# Patient Record
Sex: MALE | Race: WHITE | NOT HISPANIC OR LATINO
[De-identification: names, ages, dates, MRNs, and addresses within clinical notes are randomized per-mention and may not be internally consistent; named-entity substitution may affect disease eponyms.]

---

## 2022-11-09 ENCOUNTER — NON-APPOINTMENT (OUTPATIENT)
Age: 57
End: 2022-11-09

## 2022-11-09 DIAGNOSIS — M25.661 STIFFNESS OF RIGHT KNEE, NOT ELSEWHERE CLASSIFIED: ICD-10-CM

## 2022-11-09 DIAGNOSIS — M25.551 PAIN IN RIGHT HIP: ICD-10-CM

## 2022-11-09 DIAGNOSIS — M17.32 UNILATERAL POST-TRAUMATIC OSTEOARTHRITIS, LEFT KNEE: ICD-10-CM
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
## 2022-11-09 DIAGNOSIS — M25.651 STIFFNESS OF RIGHT HIP, NOT ELSEWHERE CLASSIFIED: ICD-10-CM
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
## 2022-11-09 DIAGNOSIS — S83.91XD SPRAIN OF UNSPECIFIED SITE OF RIGHT KNEE, SUBSEQUENT ENCOUNTER: ICD-10-CM

## 2022-11-09 DIAGNOSIS — M25.561 PAIN IN RIGHT KNEE: ICD-10-CM

## 2022-11-09 DIAGNOSIS — S73.101D UNSPECIFIED SPRAIN OF RIGHT HIP, SUBSEQUENT ENCOUNTER: ICD-10-CM

## 2022-11-09 PROBLEM — Z00.00 ENCOUNTER FOR PREVENTIVE HEALTH EXAMINATION: Status: ACTIVE | Noted: 2022-11-09

## 2022-12-21 ENCOUNTER — APPOINTMENT (OUTPATIENT)
Dept: ORTHOPEDIC SURGERY | Facility: CLINIC | Age: 57
End: 2022-12-21

## 2022-12-21 VITALS — WEIGHT: 205 LBS | HEIGHT: 71 IN | BODY MASS INDEX: 28.7 KG/M2

## 2022-12-21 DIAGNOSIS — Z78.9 OTHER SPECIFIED HEALTH STATUS: ICD-10-CM

## 2022-12-21 PROCEDURE — 99213 OFFICE O/P EST LOW 20 MIN: CPT

## 2022-12-21 PROCEDURE — 73503 X-RAY EXAM HIP UNI 4/> VIEWS: CPT | Mod: RT

## 2022-12-21 PROCEDURE — 99072 ADDL SUPL MATRL&STAF TM PHE: CPT

## 2022-12-22 NOTE — DATA REVIEWED
[FreeTextEntry1] : X-rays done in the office today the AP pelvis 1 view in the right hip 2 views show severe bone-on-bone arthritis.  This is progressed since his previous x-ray.  His left hip is unaffected.  There were no tumors masses or calcifications seen.

## 2022-12-22 NOTE — HISTORY OF PRESENT ILLNESS
[5] : 5 [3] : 3 [Dull/Aching] : dull/aching [Localized] : localized [Tightness] : tightness [Not working due to injury] : Work status: not working due to injury [] : Post Surgical Visit: no [FreeTextEntry1] : R hip  [FreeTextEntry5] : 56 Y/O RHD M alexandraal R hip atraumatic chronic pain due to overuse at work  [FreeTextEntry3] : N/A Chronic [de-identified] : None [de-identified] : [place] [id number]

## 2022-12-22 NOTE — DISCUSSION/SUMMARY
[de-identified] : Recommendation at this time is a right total hip replacement.  He has tried failed all conservative measures to improve his situation.  He is unable to work because of the pain.  His activities of daily living are significantly compromised.  His x-rays have worsened over the last year.  He is currently not working.  He has 100% temporary partial disability to the right hip.  His right hip pain is causally related to his work accident.  We will see him back in the office in 2 months.  I will request authorization for right total hip replacement to do as soon as we get authorization.

## 2022-12-22 NOTE — IMAGING
[de-identified] : Examination of the right hip reveals severe restrictions in range of motion.  He has flexion just short of 90 degrees with pain.  His abduction is to 10 degrees.  His abduction is to neutral.  His rotation internally is 0 degrees and externally 10 degrees.  He has 5-5 strength of hip flexion and abduction.  He has no redness rashes or visible scars.

## 2022-12-22 NOTE — ASSESSMENT
[FreeTextEntry1] : Patient comes in today for follow-up on his right hip.  He continues have severe progressive pain of his right hip consistent with posttraumatic arthritis.  He has pain at night he has pain putting on his shoes and socks.  He cannot walk short or long distance he struggles with stairs.  He is clearly worsening.  He is also not working.

## 2023-02-05 ENCOUNTER — FORM ENCOUNTER (OUTPATIENT)
Age: 58
End: 2023-02-05

## 2023-03-15 ENCOUNTER — APPOINTMENT (OUTPATIENT)
Dept: ORTHOPEDIC SURGERY | Facility: CLINIC | Age: 58
End: 2023-03-15
Payer: OTHER MISCELLANEOUS

## 2023-03-15 VITALS — BODY MASS INDEX: 28.7 KG/M2 | HEIGHT: 71 IN | WEIGHT: 205 LBS

## 2023-03-15 DIAGNOSIS — E78.00 PURE HYPERCHOLESTEROLEMIA, UNSPECIFIED: ICD-10-CM

## 2023-03-15 PROCEDURE — 99072 ADDL SUPL MATRL&STAF TM PHE: CPT

## 2023-03-15 PROCEDURE — 99214 OFFICE O/P EST MOD 30 MIN: CPT

## 2023-03-16 NOTE — IMAGING
[de-identified] : Examination of the right hip reveals severe restrictions in range of motion.  He has flexion just short of 90 degrees with pain.  His abduction is to 10 degrees.  His abduction is to neutral.  His rotation internally is 0 degrees and externally 10 degrees.  He has 5-5 strength of hip flexion and abduction.  He has no redness rashes or visible scars.  His right leg is very slightly shorter than the left.

## 2023-03-16 NOTE — ASSESSMENT
[FreeTextEntry1] : Patient comes in today for follow-up on his right hip. He has been approved as of today for right DAKOTAH. He wishes to schedule surgery at this time.  He continues to have severe progressive pain of his right hip consistent with posttraumatic arthritis.  He has pain at night. He has pain putting on his shoes and socks.  He cannot walk short or long distance he struggles with stairs.  He is clearly worsening.  He is also not working.

## 2023-03-16 NOTE — HISTORY OF PRESENT ILLNESS
[6] : 6 [4] : 4 [Dull/Aching] : dull/aching [Localized] : localized [Tightness] : tightness [Constant] : constant [Not working due to injury] : Work status: not working due to injury [] : Post Surgical Visit: no [FreeTextEntry1] : Rt. hip & Rt. knee [FreeTextEntry3] : N/A Chronic [FreeTextEntry5] : 58 y/o M presents for WC f/u eval. of Rt. Hip and Rt. Knee. Pt reports pain levels have increased since last visit. [de-identified] : 12/21/2022 [de-identified] : Dr. Jacobo [de-identified] : [place] [id number]

## 2023-03-16 NOTE — DISCUSSION/SUMMARY
[de-identified] : Plan at this time is to proceed with a right total hip replacement.  All risk benefits alternatives of procedure were discussed the patient detail and he wished to proceed.  He is currently not working.  He has 100% temporary partial disability to the right hip.  His right hip pain is causally related to his work accident.

## 2023-05-03 ENCOUNTER — RESULT REVIEW (OUTPATIENT)
Age: 58
End: 2023-05-03

## 2023-05-03 ENCOUNTER — OUTPATIENT (OUTPATIENT)
Dept: OUTPATIENT SERVICES | Facility: HOSPITAL | Age: 58
LOS: 1 days | End: 2023-05-03
Payer: COMMERCIAL

## 2023-05-03 VITALS
TEMPERATURE: 97 F | DIASTOLIC BLOOD PRESSURE: 91 MMHG | WEIGHT: 207.23 LBS | RESPIRATION RATE: 16 BRPM | SYSTOLIC BLOOD PRESSURE: 140 MMHG | HEART RATE: 67 BPM | HEIGHT: 71 IN | OXYGEN SATURATION: 96 %

## 2023-05-03 DIAGNOSIS — Z01.818 ENCOUNTER FOR OTHER PREPROCEDURAL EXAMINATION: ICD-10-CM

## 2023-05-03 DIAGNOSIS — Z92.89 PERSONAL HISTORY OF OTHER MEDICAL TREATMENT: Chronic | ICD-10-CM

## 2023-05-03 DIAGNOSIS — Z98.890 OTHER SPECIFIED POSTPROCEDURAL STATES: Chronic | ICD-10-CM

## 2023-05-03 DIAGNOSIS — Z29.9 ENCOUNTER FOR PROPHYLACTIC MEASURES, UNSPECIFIED: ICD-10-CM

## 2023-05-03 LAB
ABO RH CONFIRMATION: SIGNIFICANT CHANGE UP
ALBUMIN SERPL ELPH-MCNC: 3.7 G/DL — SIGNIFICANT CHANGE UP (ref 3.3–5)
ANION GAP SERPL CALC-SCNC: 6 MMOL/L — SIGNIFICANT CHANGE UP (ref 5–17)
APTT BLD: 31.4 SEC — SIGNIFICANT CHANGE UP (ref 27.5–35.5)
BASOPHILS # BLD AUTO: 0.03 K/UL — SIGNIFICANT CHANGE UP (ref 0–0.2)
BASOPHILS NFR BLD AUTO: 0.4 % — SIGNIFICANT CHANGE UP (ref 0–2)
BLD GP AB SCN SERPL QL: SIGNIFICANT CHANGE UP
BUN SERPL-MCNC: 30 MG/DL — HIGH (ref 7–23)
CALCIUM SERPL-MCNC: 9.3 MG/DL — SIGNIFICANT CHANGE UP (ref 8.5–10.1)
CHLORIDE SERPL-SCNC: 107 MMOL/L — SIGNIFICANT CHANGE UP (ref 96–108)
CO2 SERPL-SCNC: 26 MMOL/L — SIGNIFICANT CHANGE UP (ref 22–31)
CREAT SERPL-MCNC: 1.38 MG/DL — HIGH (ref 0.5–1.3)
EGFR: 59 ML/MIN/1.73M2 — LOW
EOSINOPHIL # BLD AUTO: 0.33 K/UL — SIGNIFICANT CHANGE UP (ref 0–0.5)
EOSINOPHIL NFR BLD AUTO: 4.7 % — SIGNIFICANT CHANGE UP (ref 0–6)
GLUCOSE SERPL-MCNC: 124 MG/DL — HIGH (ref 70–99)
HCT VFR BLD CALC: 46.8 % — SIGNIFICANT CHANGE UP (ref 39–50)
HGB BLD-MCNC: 16.8 G/DL — SIGNIFICANT CHANGE UP (ref 13–17)
IMM GRANULOCYTES NFR BLD AUTO: 0.3 % — SIGNIFICANT CHANGE UP (ref 0–0.9)
INR BLD: 1.19 RATIO — HIGH (ref 0.88–1.16)
LYMPHOCYTES # BLD AUTO: 1.42 K/UL — SIGNIFICANT CHANGE UP (ref 1–3.3)
LYMPHOCYTES # BLD AUTO: 20.4 % — SIGNIFICANT CHANGE UP (ref 13–44)
MCHC RBC-ENTMCNC: 31.3 PG — SIGNIFICANT CHANGE UP (ref 27–34)
MCHC RBC-ENTMCNC: 35.9 GM/DL — SIGNIFICANT CHANGE UP (ref 32–36)
MCV RBC AUTO: 87.3 FL — SIGNIFICANT CHANGE UP (ref 80–100)
MONOCYTES # BLD AUTO: 0.48 K/UL — SIGNIFICANT CHANGE UP (ref 0–0.9)
MONOCYTES NFR BLD AUTO: 6.9 % — SIGNIFICANT CHANGE UP (ref 2–14)
NEUTROPHILS # BLD AUTO: 4.67 K/UL — SIGNIFICANT CHANGE UP (ref 1.8–7.4)
NEUTROPHILS NFR BLD AUTO: 67.3 % — SIGNIFICANT CHANGE UP (ref 43–77)
PLATELET # BLD AUTO: 194 K/UL — SIGNIFICANT CHANGE UP (ref 150–400)
POTASSIUM SERPL-MCNC: 3.7 MMOL/L — SIGNIFICANT CHANGE UP (ref 3.5–5.3)
POTASSIUM SERPL-SCNC: 3.7 MMOL/L — SIGNIFICANT CHANGE UP (ref 3.5–5.3)
PROTHROM AB SERPL-ACNC: 13.8 SEC — HIGH (ref 10.5–13.4)
RBC # BLD: 5.36 M/UL — SIGNIFICANT CHANGE UP (ref 4.2–5.8)
RBC # FLD: 12.5 % — SIGNIFICANT CHANGE UP (ref 10.3–14.5)
SODIUM SERPL-SCNC: 139 MMOL/L — SIGNIFICANT CHANGE UP (ref 135–145)
WBC # BLD: 6.95 K/UL — SIGNIFICANT CHANGE UP (ref 3.8–10.5)
WBC # FLD AUTO: 6.95 K/UL — SIGNIFICANT CHANGE UP (ref 3.8–10.5)

## 2023-05-03 PROCEDURE — 71046 X-RAY EXAM CHEST 2 VIEWS: CPT

## 2023-05-03 PROCEDURE — 85025 COMPLETE CBC W/AUTO DIFF WBC: CPT

## 2023-05-03 PROCEDURE — 99214 OFFICE O/P EST MOD 30 MIN: CPT | Mod: 25

## 2023-05-03 PROCEDURE — 86850 RBC ANTIBODY SCREEN: CPT

## 2023-05-03 PROCEDURE — 93010 ELECTROCARDIOGRAM REPORT: CPT

## 2023-05-03 PROCEDURE — 86900 BLOOD TYPING SEROLOGIC ABO: CPT

## 2023-05-03 PROCEDURE — 93005 ELECTROCARDIOGRAM TRACING: CPT

## 2023-05-03 PROCEDURE — 85610 PROTHROMBIN TIME: CPT

## 2023-05-03 PROCEDURE — 83036 HEMOGLOBIN GLYCOSYLATED A1C: CPT

## 2023-05-03 PROCEDURE — 82040 ASSAY OF SERUM ALBUMIN: CPT

## 2023-05-03 PROCEDURE — 87640 STAPH A DNA AMP PROBE: CPT

## 2023-05-03 PROCEDURE — 86901 BLOOD TYPING SEROLOGIC RH(D): CPT

## 2023-05-03 PROCEDURE — 36415 COLL VENOUS BLD VENIPUNCTURE: CPT

## 2023-05-03 PROCEDURE — 71046 X-RAY EXAM CHEST 2 VIEWS: CPT | Mod: 26

## 2023-05-03 PROCEDURE — 80048 BASIC METABOLIC PNL TOTAL CA: CPT

## 2023-05-03 PROCEDURE — 85730 THROMBOPLASTIN TIME PARTIAL: CPT

## 2023-05-03 PROCEDURE — 87641 MR-STAPH DNA AMP PROBE: CPT

## 2023-05-03 NOTE — H&P PST ADULT - ASSESSMENT
58 y.o male  58 y.o male scheduled for  Right Total Hip Replacement   Plan  1. Stop all NSAIDS, herbal supplements and vitamins for 7 days.  2. NPO at midnight.  3. Take the following medications--none-- with small sips of water on the morning of your procedure/surgery.  4. Use EZ sponges as directed  5. Use mupirocin as directed  6. Labs, EKG, CXR as per surgeon  7. PMD RAKESH Humphrey cardiologist visit for optimization prior to surgery as per surgeon.  8. Joint Class completed  today  9. Patient will require a walker post op  10. Preprocedure education provided     CAPRINI SCORE    AGE RELATED RISK FACTORS                                                       MOBILITY RELATED FACTORS  [xAge 41-60 years                                            (1 Point)                  [ ] Bed rest                                                        (1 Point)  [ ] Age: 61-74 years                                           (2 Points)                [ ] Plaster cast                                                   (2 Points)  [ ] Age= 75 years                                              (3 Points)                 [ ] Bed bound for more than 72 hours                   (2 Points)    DISEASE RELATED RISK FACTORS                                               GENDER SPECIFIC FACTORS  [ ] Edema in the lower extremities                       (1 Point)                  [ ] Pregnancy                                                     (1 Point)  [ ] Varicose veins                                               (1 Point)                  [ ] Post-partum < 6 weeks                                   (1 Point)             [x ] BMI > 25 Kg/m2                                            (1 Point)                  [ ] Hormonal therapy  or oral contraception            (1 Point)                 [ ] Sepsis (in the previous month)                        (1 Point)                  [ ] History of pregnancy complications  [ ] Pneumonia or serious lung disease                                               [ ] Unexplained or recurrent                       (1 Point)           (in the previous month)                               (1 Point)  [ ] Abnormal pulmonary function test                     (1 Point)                 SURGERY RELATED RISK FACTORS  [ ] Acute myocardial infarction                              (1 Point)                 [ ]  Section                                            (1 Point)  [ ] Congestive heart failure (in the previous month)  (1 Point)                 [ ] Minor surgery                                                 (1 Point)   [ ] Inflammatory bowel disease                             (1 Point)                 [ ] Arthroscopic surgery                                        (2 Points)  [ ] Central venous access                                    (2 Points)                [ ] General surgery lasting more than 45 minutes   (2 Points)       [ ] Stroke (in the previous month)                          (5 Points)               [ x] Elective arthroplasty                                        (5 Points)                                                                                                                                               HEMATOLOGY RELATED FACTORS                                                 TRAUMA RELATED RISK FACTORS  [ ] Prior episodes of VTE                                     (3 Points)                 [ ] Fracture of the hip, pelvis, or leg                       (5 Points)  [ ] Positive family history for VTE                         (3 Points)                 [ ] Acute spinal cord injury (in the previous month)  (5 Points)  [ ] Prothrombin 03644 A                                      (3 Points)                 [ ] Paralysis  (less than 1 month)                          (5 Points)  [ ] Factor V Leiden                                             (3 Points)                 [ ] Multiple Trauma within 1 month                         (5 Points)  [ ] Lupus anticoagulants                                     (3 Points)                                                           [ ] Anticardiolipin antibodies                                (3 Points)                                                       [ ] High homocysteine in the blood                      (3 Points)                                             [ ] Other congenital or acquired thrombophilia       (3 Points)                                                [ ] Heparin induced thrombocytopenia                  (3 Points)                                          Total Score [    7      ]    The Caprini score indicates that this patient is at high risk for a VTE event (score > = 6).    Surgical patients in this group will benefit from both pharmacologic prophylaxis and intermittent compression devices.    The surgical team will determine the balance between VTE risk and bleeding risk, and other clinical considerations.

## 2023-05-03 NOTE — H&P PST ADULT - NSICDXPASTMEDICALHX_GEN_ALL_CORE_FT
PAST MEDICAL HISTORY:  Arthritis     Diverticulitis     Hyperlipidemia     Risk factors for obstructive sleep apnea

## 2023-05-03 NOTE — H&P PST ADULT - HISTORY OF PRESENT ILLNESS
58 y.o WD, WN pleasant male presents to PST with hx of right hip pain. Patient reports right hip pain for years. He has treated conservatively with short lived relief.. Patient has followed with ortho and states diagnostics indicate "bone on bone" He is now scheduled for a Right Total Hip Replacement

## 2023-05-03 NOTE — H&P PST ADULT - NSICDXFAMILYHX_GEN_ALL_CORE_FT
FAMILY HISTORY:  Father  Still living? No  FH: heart disease, Age at diagnosis: Age Unknown    Sibling  Still living? Yes, Estimated age: Age Unknown  FH: kidney cancer, Age at diagnosis: Age Unknown  FH: lung cancer, Age at diagnosis: Age Unknown  FH: thyroid cancer, Age at diagnosis: Age Unknown

## 2023-05-04 DIAGNOSIS — Z01.818 ENCOUNTER FOR OTHER PREPROCEDURAL EXAMINATION: ICD-10-CM

## 2023-05-04 LAB
A1C WITH ESTIMATED AVERAGE GLUCOSE RESULT: 4.8 % — SIGNIFICANT CHANGE UP (ref 4–5.6)
ESTIMATED AVERAGE GLUCOSE: 91 MG/DL — SIGNIFICANT CHANGE UP (ref 68–114)
MRSA PCR RESULT.: SIGNIFICANT CHANGE UP
S AUREUS DNA NOSE QL NAA+PROBE: SIGNIFICANT CHANGE UP

## 2023-05-05 ENCOUNTER — TRANSCRIPTION ENCOUNTER (OUTPATIENT)
Age: 58
End: 2023-05-05

## 2023-05-05 RX ORDER — CELECOXIB 200 MG/1
200 CAPSULE ORAL EVERY 12 HOURS
Refills: 0 | Status: DISCONTINUED | OUTPATIENT
Start: 2023-05-10 | End: 2023-05-10

## 2023-05-05 RX ORDER — FOLIC ACID 0.8 MG
1 TABLET ORAL DAILY
Refills: 0 | Status: DISCONTINUED | OUTPATIENT
Start: 2023-05-09 | End: 2023-05-10

## 2023-05-05 RX ORDER — HYDROMORPHONE HYDROCHLORIDE 2 MG/ML
0.5 INJECTION INTRAMUSCULAR; INTRAVENOUS; SUBCUTANEOUS EVERY 4 HOURS
Refills: 0 | Status: DISCONTINUED | OUTPATIENT
Start: 2023-05-09 | End: 2023-05-10

## 2023-05-05 RX ORDER — OXYCODONE HYDROCHLORIDE 5 MG/1
10 TABLET ORAL
Refills: 0 | Status: DISCONTINUED | OUTPATIENT
Start: 2023-05-09 | End: 2023-05-10

## 2023-05-05 RX ORDER — SENNA PLUS 8.6 MG/1
2 TABLET ORAL AT BEDTIME
Refills: 0 | Status: DISCONTINUED | OUTPATIENT
Start: 2023-05-09 | End: 2023-05-10

## 2023-05-05 RX ORDER — ONDANSETRON 8 MG/1
4 TABLET, FILM COATED ORAL EVERY 6 HOURS
Refills: 0 | Status: DISCONTINUED | OUTPATIENT
Start: 2023-05-09 | End: 2023-05-10

## 2023-05-05 RX ORDER — FERROUS SULFATE 325(65) MG
325 TABLET ORAL DAILY
Refills: 0 | Status: DISCONTINUED | OUTPATIENT
Start: 2023-05-09 | End: 2023-05-10

## 2023-05-05 RX ORDER — POLYETHYLENE GLYCOL 3350 17 G/17G
17 POWDER, FOR SOLUTION ORAL AT BEDTIME
Refills: 0 | Status: DISCONTINUED | OUTPATIENT
Start: 2023-05-09 | End: 2023-05-10

## 2023-05-05 RX ORDER — MAGNESIUM HYDROXIDE 400 MG/1
30 TABLET, CHEWABLE ORAL DAILY
Refills: 0 | Status: DISCONTINUED | OUTPATIENT
Start: 2023-05-09 | End: 2023-05-10

## 2023-05-05 RX ORDER — ACETAMINOPHEN 500 MG
975 TABLET ORAL EVERY 8 HOURS
Refills: 0 | Status: DISCONTINUED | OUTPATIENT
Start: 2023-05-09 | End: 2023-05-10

## 2023-05-05 RX ORDER — OXYCODONE HYDROCHLORIDE 5 MG/1
5 TABLET ORAL
Refills: 0 | Status: DISCONTINUED | OUTPATIENT
Start: 2023-05-09 | End: 2023-05-10

## 2023-05-05 RX ORDER — SODIUM CHLORIDE 9 MG/ML
1000 INJECTION, SOLUTION INTRAVENOUS
Refills: 0 | Status: DISCONTINUED | OUTPATIENT
Start: 2023-05-10 | End: 2023-05-10

## 2023-05-08 RX ORDER — TRANEXAMIC ACID 100 MG/ML
1000 INJECTION, SOLUTION INTRAVENOUS ONCE
Refills: 0 | Status: DISCONTINUED | OUTPATIENT
Start: 2023-05-09 | End: 2023-05-10

## 2023-05-09 ENCOUNTER — TRANSCRIPTION ENCOUNTER (OUTPATIENT)
Age: 58
End: 2023-05-09

## 2023-05-09 ENCOUNTER — RESULT REVIEW (OUTPATIENT)
Age: 58
End: 2023-05-09

## 2023-05-09 ENCOUNTER — APPOINTMENT (OUTPATIENT)
Dept: ORTHOPEDIC SURGERY | Facility: HOSPITAL | Age: 58
End: 2023-05-09

## 2023-05-09 ENCOUNTER — INPATIENT (INPATIENT)
Facility: HOSPITAL | Age: 58
LOS: 0 days | Discharge: HOME HEALTH | DRG: 301 | End: 2023-05-10
Attending: ORTHOPAEDIC SURGERY | Admitting: ORTHOPAEDIC SURGERY
Payer: COMMERCIAL

## 2023-05-09 VITALS
HEIGHT: 71 IN | TEMPERATURE: 98 F | SYSTOLIC BLOOD PRESSURE: 153 MMHG | OXYGEN SATURATION: 100 % | WEIGHT: 207.23 LBS | HEART RATE: 60 BPM | DIASTOLIC BLOOD PRESSURE: 105 MMHG | RESPIRATION RATE: 16 BRPM

## 2023-05-09 DIAGNOSIS — M16.51 UNILATERAL POST-TRAUMATIC OSTEOARTHRITIS, RIGHT HIP: ICD-10-CM

## 2023-05-09 DIAGNOSIS — Z98.890 OTHER SPECIFIED POSTPROCEDURAL STATES: Chronic | ICD-10-CM

## 2023-05-09 DIAGNOSIS — Z92.89 PERSONAL HISTORY OF OTHER MEDICAL TREATMENT: Chronic | ICD-10-CM

## 2023-05-09 LAB
ANION GAP SERPL CALC-SCNC: 3 MMOL/L — LOW (ref 5–17)
BUN SERPL-MCNC: 22 MG/DL — SIGNIFICANT CHANGE UP (ref 7–23)
CALCIUM SERPL-MCNC: 8.1 MG/DL — LOW (ref 8.5–10.1)
CHLORIDE SERPL-SCNC: 110 MMOL/L — HIGH (ref 96–108)
CO2 SERPL-SCNC: 28 MMOL/L — SIGNIFICANT CHANGE UP (ref 22–31)
CREAT SERPL-MCNC: 1.09 MG/DL — SIGNIFICANT CHANGE UP (ref 0.5–1.3)
EGFR: 78 ML/MIN/1.73M2 — SIGNIFICANT CHANGE UP
GLUCOSE BLDC GLUCOMTR-MCNC: 100 MG/DL — HIGH (ref 70–99)
GLUCOSE BLDC GLUCOMTR-MCNC: 143 MG/DL — HIGH (ref 70–99)
GLUCOSE BLDC GLUCOMTR-MCNC: 97 MG/DL — SIGNIFICANT CHANGE UP (ref 70–99)
GLUCOSE SERPL-MCNC: 110 MG/DL — HIGH (ref 70–99)
HCT VFR BLD CALC: 42.7 % — SIGNIFICANT CHANGE UP (ref 39–50)
HGB BLD-MCNC: 14.9 G/DL — SIGNIFICANT CHANGE UP (ref 13–17)
MCHC RBC-ENTMCNC: 31 PG — SIGNIFICANT CHANGE UP (ref 27–34)
MCHC RBC-ENTMCNC: 34.9 GM/DL — SIGNIFICANT CHANGE UP (ref 32–36)
MCV RBC AUTO: 89 FL — SIGNIFICANT CHANGE UP (ref 80–100)
PLATELET # BLD AUTO: 155 K/UL — SIGNIFICANT CHANGE UP (ref 150–400)
POTASSIUM SERPL-MCNC: 4.3 MMOL/L — SIGNIFICANT CHANGE UP (ref 3.5–5.3)
POTASSIUM SERPL-SCNC: 4.3 MMOL/L — SIGNIFICANT CHANGE UP (ref 3.5–5.3)
RBC # BLD: 4.8 M/UL — SIGNIFICANT CHANGE UP (ref 4.2–5.8)
RBC # FLD: 12.3 % — SIGNIFICANT CHANGE UP (ref 10.3–14.5)
SODIUM SERPL-SCNC: 141 MMOL/L — SIGNIFICANT CHANGE UP (ref 135–145)
WBC # BLD: 8.63 K/UL — SIGNIFICANT CHANGE UP (ref 3.8–10.5)
WBC # FLD AUTO: 8.63 K/UL — SIGNIFICANT CHANGE UP (ref 3.8–10.5)

## 2023-05-09 PROCEDURE — 85027 COMPLETE CBC AUTOMATED: CPT

## 2023-05-09 PROCEDURE — 99252 IP/OBS CONSLTJ NEW/EST SF 35: CPT

## 2023-05-09 PROCEDURE — 97166 OT EVAL MOD COMPLEX 45 MIN: CPT | Mod: GO

## 2023-05-09 PROCEDURE — 88305 TISSUE EXAM BY PATHOLOGIST: CPT | Mod: 26

## 2023-05-09 PROCEDURE — 80048 BASIC METABOLIC PNL TOTAL CA: CPT

## 2023-05-09 PROCEDURE — 27130 TOTAL HIP ARTHROPLASTY: CPT | Mod: RT

## 2023-05-09 PROCEDURE — 27130 TOTAL HIP ARTHROPLASTY: CPT | Mod: AS

## 2023-05-09 PROCEDURE — 73501 X-RAY EXAM HIP UNI 1 VIEW: CPT | Mod: 26,RT

## 2023-05-09 PROCEDURE — 99254 IP/OBS CNSLTJ NEW/EST MOD 60: CPT

## 2023-05-09 PROCEDURE — 97535 SELF CARE MNGMENT TRAINING: CPT | Mod: GO

## 2023-05-09 PROCEDURE — 97116 GAIT TRAINING THERAPY: CPT | Mod: GP

## 2023-05-09 PROCEDURE — C1889: CPT

## 2023-05-09 PROCEDURE — 36415 COLL VENOUS BLD VENIPUNCTURE: CPT

## 2023-05-09 PROCEDURE — C1713: CPT

## 2023-05-09 PROCEDURE — 82962 GLUCOSE BLOOD TEST: CPT

## 2023-05-09 PROCEDURE — 88305 TISSUE EXAM BY PATHOLOGIST: CPT

## 2023-05-09 PROCEDURE — 73501 X-RAY EXAM HIP UNI 1 VIEW: CPT | Mod: RT

## 2023-05-09 PROCEDURE — C1776: CPT

## 2023-05-09 PROCEDURE — 97530 THERAPEUTIC ACTIVITIES: CPT | Mod: GP

## 2023-05-09 PROCEDURE — 97161 PT EVAL LOW COMPLEX 20 MIN: CPT | Mod: GP

## 2023-05-09 RX ORDER — OXYCODONE HYDROCHLORIDE 5 MG/1
5 TABLET ORAL ONCE
Refills: 0 | Status: DISCONTINUED | OUTPATIENT
Start: 2023-05-09 | End: 2023-05-09

## 2023-05-09 RX ORDER — SODIUM CHLORIDE 9 MG/ML
1000 INJECTION, SOLUTION INTRAVENOUS
Refills: 0 | Status: DISCONTINUED | OUTPATIENT
Start: 2023-05-09 | End: 2023-05-09

## 2023-05-09 RX ORDER — DEXAMETHASONE 0.5 MG/5ML
8 ELIXIR ORAL ONCE
Refills: 0 | Status: COMPLETED | OUTPATIENT
Start: 2023-05-10 | End: 2023-05-10

## 2023-05-09 RX ORDER — ASPIRIN/CALCIUM CARB/MAGNESIUM 324 MG
81 TABLET ORAL
Refills: 0 | Status: DISCONTINUED | OUTPATIENT
Start: 2023-05-09 | End: 2023-05-10

## 2023-05-09 RX ORDER — OXYCODONE HYDROCHLORIDE 5 MG/1
1 TABLET ORAL
Qty: 30 | Refills: 0
Start: 2023-05-09 | End: 2023-05-13

## 2023-05-09 RX ORDER — PANTOPRAZOLE SODIUM 20 MG/1
40 TABLET, DELAYED RELEASE ORAL ONCE
Refills: 0 | Status: COMPLETED | OUTPATIENT
Start: 2023-05-09 | End: 2023-05-09

## 2023-05-09 RX ORDER — ACETAMINOPHEN 500 MG
2 TABLET ORAL
Qty: 84 | Refills: 0
Start: 2023-05-09 | End: 2023-05-22

## 2023-05-09 RX ORDER — FENTANYL CITRATE 50 UG/ML
50 INJECTION INTRAVENOUS
Refills: 0 | Status: DISCONTINUED | OUTPATIENT
Start: 2023-05-09 | End: 2023-05-09

## 2023-05-09 RX ORDER — SENNA PLUS 8.6 MG/1
2 TABLET ORAL
Qty: 28 | Refills: 0
Start: 2023-05-09 | End: 2023-05-22

## 2023-05-09 RX ORDER — CEFAZOLIN SODIUM 1 G
2000 VIAL (EA) INJECTION EVERY 8 HOURS
Refills: 0 | Status: COMPLETED | OUTPATIENT
Start: 2023-05-09 | End: 2023-05-10

## 2023-05-09 RX ORDER — PANTOPRAZOLE SODIUM 20 MG/1
1 TABLET, DELAYED RELEASE ORAL
Qty: 30 | Refills: 0
Start: 2023-05-09 | End: 2023-06-07

## 2023-05-09 RX ORDER — ONDANSETRON 8 MG/1
4 TABLET, FILM COATED ORAL ONCE
Refills: 0 | Status: DISCONTINUED | OUTPATIENT
Start: 2023-05-09 | End: 2023-05-09

## 2023-05-09 RX ORDER — ASPIRIN/CALCIUM CARB/MAGNESIUM 324 MG
1 TABLET ORAL
Qty: 60 | Refills: 0
Start: 2023-05-09 | End: 2023-06-07

## 2023-05-09 RX ORDER — POLYETHYLENE GLYCOL 3350 17 G/17G
17 POWDER, FOR SOLUTION ORAL
Qty: 1 | Refills: 0
Start: 2023-05-09 | End: 2023-05-22

## 2023-05-09 RX ADMIN — OXYCODONE HYDROCHLORIDE 10 MILLIGRAM(S): 5 TABLET ORAL at 17:12

## 2023-05-09 RX ADMIN — OXYCODONE HYDROCHLORIDE 10 MILLIGRAM(S): 5 TABLET ORAL at 22:10

## 2023-05-09 RX ADMIN — PANTOPRAZOLE SODIUM 40 MILLIGRAM(S): 20 TABLET, DELAYED RELEASE ORAL at 09:02

## 2023-05-09 RX ADMIN — Medication 81 MILLIGRAM(S): at 21:10

## 2023-05-09 RX ADMIN — OXYCODONE HYDROCHLORIDE 10 MILLIGRAM(S): 5 TABLET ORAL at 18:00

## 2023-05-09 RX ADMIN — Medication 100 MILLIGRAM(S): at 18:31

## 2023-05-09 RX ADMIN — OXYCODONE HYDROCHLORIDE 10 MILLIGRAM(S): 5 TABLET ORAL at 21:10

## 2023-05-09 NOTE — DISCHARGE NOTE PROVIDER - HOSPITAL COURSE
H&P:  Pt is a 58y Male    PAST MEDICAL & SURGICAL HISTORY:  Arthritis      Risk factors for obstructive sleep apnea      Diverticulitis      Hyperlipidemia      History of dental surgery      H/O colonoscopy           Now s/p Right Total Hip Arthroplasty. Pt is afebrile with stable vital signs. Pain is controlled. Alert and Oriented. Exam reveals intact EHL FHL TA GS, +DP. Dressing is clean and dry.    Hospital Course:  Patient presented to [facility] medically cleared for elective Right Total Hip Replacement Surgery, having failed outpatient conservative management. Prophylactic antibiotics were started before the procedure and continued for 24 hours. They were admitted after surgery to the orthopedic floor. There were no complications during the hospital stay. All home medications were continued.     **Pt received **U PRBC post op for Acute Blood Loss Anemia.    Routine consults were obtained from the Anticoagulation Team for DVT/PE prophylaxis, from Physical Therapy for twice daily PT, and from the Hospitalist for Medical Co-management. Patient was placed on anticoagulation. Pertinent home medications were continued. Daily labs were followed.      POD 0 pt was stable overnight. No major events post-op. Pt received PT twice daily. The plan is for DC to home with home PT** or to Rehab for ongoing PT**. The orthopedic Attending is aware and agrees.      **POD1 DC DOCUMENTAION** (Keep or Delete depending on scenario)  The patient had no post-operative complications and clinically progressed faster than anticipated. The following condition(s) were actively treated during the hospital stay:  HLD  The patient met criteria for discharge before the 2nd night of the stay. The patient was appropriately and safely discharged home with home PT.    ******INCOMPLETE NOTE IN PREPARATION FOR DISPO PLANNING*******  ******INCOMPLETE NOTE IN PREPARATION FOR DISPO PLANNING*******  ******INCOMPLETE NOTE IN PREPARATION FOR DISPO PLANNING******* H&P:  Pt is a 58y Male    PAST MEDICAL & SURGICAL HISTORY:  Arthritis      Risk factors for obstructive sleep apnea      Diverticulitis      Hyperlipidemia      History of dental surgery      H/O colonoscopy           Now s/p Right Total Hip Arthroplasty. Pt is afebrile with stable vital signs. Pain is controlled. Alert and Oriented. Exam reveals intact EHL FHL TA GS, +DP. Dressing is clean and dry.    Hospital Course:  Patient presented to [facility] medically cleared for elective Right Total Hip Replacement Surgery, having failed outpatient conservative management. Prophylactic antibiotics were started before the procedure and continued for 24 hours. They were admitted after surgery to the orthopedic floor. There were no complications during the hospital stay. All home medications were continued.     Routine consults were obtained from the Anticoagulation Team for DVT/PE prophylaxis, from Physical Therapy for twice daily PT, and from the Hospitalist for Medical Co-management. Patient was placed on anticoagulation. Pertinent home medications were continued. Daily labs were followed.      POD 0 pt was stable overnight. No major events post-op. Pt received PT twice daily. The plan is for DC to home with home PT. The orthopedic Attending is aware and agrees.      The patient had no post-operative complications and clinically progressed faster than anticipated. The following condition(s) were actively treated during the hospital stay:  HLD  The patient met criteria for discharge before the 2nd night of the stay. The patient was appropriately and safely discharged home with home PT.

## 2023-05-09 NOTE — CONSULT NOTE ADULT - SUBJECTIVE AND OBJECTIVE BOX
PCP:  Kathya Cyr  Ortho:  [facility]    CHIEF COMPLAINT:  right hip pain    HISTORY OF THE PRESENT ILLNESS:  58 M with long standing history of right hip pain after a work-related injury.  Over the past several months, he has developed progressive pain of the right hip limiting his ability to ambulate.  He has pain with walking on flat surfaces, up inclines and also gets pain at rest at night.  He has tried conservative strategies to mitigate the pain which have been short-lived.  The pain has also limited his ability to complete his activities of daily living.  He was evaluated by ortho and had imaging done which shows severe bone-on-bone osteoarthritis.  He agreed to undergo surgical intervention.  Today, he had a right total hip replacement with Dr. Jacobo.  In the PACU, his vital signs have been stable.  The hospitalist service has been consulted to assist with post-op medical management.    At this time, he reports mild right hip pain, but no chest pain, chest pressure, shortness of breath, nausea, palpitations or dizziness.    PAST MEDICAL HISTORY:  Osteoarthritis of the right hip  Hyperlipidemia - on diet control now  Chronic RBBB on EKG  Diverticulosis and Diverticulitis  ? CKD - on pre-op labs he was noted to have a Cr of 1.38, however, he had a coronary CT just near the time of the blood work    PAST SURGICAL HISTORY:    FAMILY HISTORY:     Father - heart disease  Sister - stomach cancer  Brother - kidney cancer, thyroid cancer, lung cancer    SOCIAL HISTORY:  no smoking, drinks alcohol occasionally, no drugs    REVIEW OF SYSTEMS:   All other systems reviewed in detailed and found to be negative with the exception of what has already been described above    MEDICATIONS  (STANDING):  lactated ringers. 1000 milliLiter(s) (125 mL/Hr) IV Continuous <Continuous>  tranexamic acid IVPB 1000 milliGRAM(s) IV Intermittent once  tranexamic acid IVPB 1000 milliGRAM(s) IV Intermittent once    MEDICATIONS  (PRN):  fentaNYL    Injectable 50 MICROGram(s) IV Push every 10 minutes PRN Severe Pain (7 - 10)  ondansetron Injectable 4 milliGRAM(s) IV Push once PRN Nausea and/or Vomiting  oxyCODONE    IR 5 milliGRAM(s) Oral once PRN Moderate Pain (4 - 6)    VITALS:  T(F): 97 (05-09-23 @ 12:13), Max: 97.6 (05-09-23 @ 08:36)  HR: 72 (05-09-23 @ 13:15) (60 - 79)  BP: 122/70 (05-09-23 @ 13:15) (110/71 - 153/105)  RR: 14 (05-09-23 @ 13:30) (14 - 17)  SpO2: 96% (05-09-23 @ 13:30) (96% - 100%)  I&O's Summary    09 May 2023 07:01  -  09 May 2023 13:46  --------------------------------------------------------  IN: 1800 mL / OUT: 0 mL / NET: 1800 mL    CAPILLARY BLOOD GLUCOSE  POCT Blood Glucose.: 100 mg/dL (09 May 2023 12:15)  POCT Blood Glucose.: 97 mg/dL (09 May 2023 08:16)    PHYSICAL EXAM:  HEENT:  pupils equal and reactive, EOMI, no oropharyngeal lesions, erythema, exudates, oral thrush  NECK:   supple, no carotid bruits, no palpable lymph nodes, no thyromegaly  CV:  +S1, +S2, regular, no murmurs or rubs  RESP:   lungs clear to auscultation bilaterally, no wheezing, rales, rhonchi, good air entry bilaterally  BREAST:  not examined  GI:  abdomen soft, non-tender, non-distended, normal BS, no bruits, no abdominal masses, no palpable masses  RECTAL:  not examined  :  not examined  MSK:   normal muscle tone, no atrophy, no rigidity, no contractions  EXT:  Right hip swelling noted, + wound and dressing, no LE edema  VASCULAR:  pulses equal and symmetric in the upper and lower extremities  NEURO:  AAOX3, no focal neurological deficits, follows all commands, however at this time he is unable to lift his right leg off the bed or wiggle the toes on his right foot  SKIN:  no ulcers, lesions or rashes    LABS:                       14.9   8.63  )-----------( 155      ( 09 May 2023 12:46 )             42.7     05-09    141  |  110<H>  |  22  ----------------------------<  110<H>  4.3   |  28  |  1.09    Ca    8.1<L>      09 May 2023 12:46    IMPRESSION:    S/P ELECTIVE RIGHT TOTAL HIP REPLACEMENT FOR SEVERE OSTEOARTHRITIS, POD #0    HYPERLIPIDEMIA - DIET CONTROLLED    NO EVIDENCE OF RHIANNON OR CKD.  RENAL FUNCTION HAS NORMALIZED      RECOMMENDATIONS:  -  pain control  -  incentive spirometry  -  OOB to chair  -  d/c IVFs  -  low cholesterol diet  -  check labs in am  -  PT evaluation and treatment  -  wound care per ortho  -  neurovascular checks  -  DVT prophylaxis  -  anticoagulation consult  -  strict Is/os  -  complete perioperative ABXs  -  will follow with you    d/w patient and PACU RN

## 2023-05-09 NOTE — CONSULT NOTE ADULT - SUBJECTIVE AND OBJECTIVE BOX
HPI:      Patient is a 58y old  Male who presents with a chief complaint of right hip  pain now s/p Right TH replacement (09 May 2023 13:45)      Consulted by Dr. Jay Jacobo  for VTE prophylaxis, risk stratification, and anticoagulation management.    PAST MEDICAL & SURGICAL HISTORY:  Arthritis      Risk factors for obstructive sleep apnea      Diverticulitis      Hyperlipidemia      History of dental surgery      H/O colonoscopy    Interval History    2023:Patient seen at bedside , discussed the necessity of DVT prophylaxis post procedure, patient denies any h/o vte, stroke, or cancer , advised patient to take ASA 81mg BID with food for 4 weeks , advised patient to refrain from NSAIDs while on ASA , patient in agreement with the plan       CrCl:  BMI:28.9  EBL:    Caprini VTE Risk Score:  AGE RELATED RISK FACTORS                                                       MOBILITY RELATED FACTORS  [x ] Age 41-60 years                                            (1 Point)                  [ ] Bed rest /restricted mobility                             (1 Point)  [ ] Age: 61-74 years                                           (2 Points)                [ ] Plaster cast                                                   (2 Points)  [ ] Age= 75 years                                              (3 Points)                 [ ] Bed bound for more than 72 hours                   (2 Points)    DISEASE RELATED RISK FACTORS                                               GENDER SPECIFIC FACTORS  [ ] Edema in the lower extremities                       (1 Point)           [ ] Pregnancy                                                            (1 Point)  [ ] Varicose veins                                               (1 Point)                  [ ] Post-partum < 6 weeks                                      (1 Point)             [x ] BMI > 25 Kg/m2                                            (1 Point)                  [ ] Hormonal therapy or oral contraception       (1 Point)                 [ ] Sepsis (in the previous month)                        (1 Point)             [ ] History of pregnancy complications                (1Point)  [ ] Pneumonia or serious lung disease                                             [ ] Unexplained or recurrent  (=/>3), premature                                 (In the previous month)                               (1 Point)                birth with toxemia or growth-restricted infant (1 Point)  [ ] Abnormal pulmonary function test            (1 Point)                                   SURGERY RELATED RISK FACTORS  [ ] Acute myocardial infarction                       (1 Point)                  [ ]  Section                                         (1 Point)  [ ] Congestive heart failure (in the previous month) (1 Point)   [ ] Minor surgery   lasting <45 minutes       (1 Point)   [ ] Inflammatory bowel disease                             (1 Point)          [ ] Arthroscopic surgery                                  (2 Points)  [ ] Central venous access                                    (2 Points)            [ ] General surgery lasting >45 minutes      (2 Points)       [ ] Stroke (in the previous month)                  (5 Points)            [x ] Elective major lower extremity arthroplasty (5 Points)                                   [  ] Malignancy (present or past include skin melanoma                                          but exclude  basal skin cell)    (2 points)                                      TRAUMA RELATED RISK FACTORS                HEMATOLOGY RELATED FACTORS                                  [ ] Fracture of the hip, pelvis, or leg                       (5 Points)  [ ] Prior episodes of VTE                                     (3 Points)          [ ] Acute spinal cord injury (in the previous month)  (5 Points)  [ ] Positive family history for VTE                         (3 Points)       [ ] Paralysis (less than 1 month)                          (5 Points)  [ ] Prothrombin 90488 A                                      (3 Points)         [ ] Multiple Trauma (within 1month)                 (5Points)                                                                                                                                                                [ ] Factor V Leiden                                          (3 Points)                                OTHER RISK FACTORS                          [ ] Lupus anticoagulants                                     (3 Points)                       [ ] BMI > 40                          (1 Point)                                                         [ ] Anticardiolipin antibodies                                (3 Points)                   [ ] Smoking                              (1Point)                                                [ ] High homocysteine in the blood                      (3 Points)                [  ] Diabetes requiring insulin (1point)                         [ ] Other congenital or acquired thrombophilia       (3 Points)          [  ] Chemotherapy                   (1 Point)  [ ] Heparin induced thrombocytopenia                  (3 Points)             [  ] Blood Transfusion                (1 point)                                                                                                             Total Score [  7        ]                                                                                                                                                                                                                                         IMPROVE Bleeding Risk Score:2.5      Falls Risk:   High (  )  Mod ( x )  Low (  )      FAMILY HISTORY:  FH: heart disease (Father)    FH: thyroid cancer (Sibling)    FH: kidney cancer (Sibling)    FH: lung cancer (Sibling)      Denies any personal or familial history of clotting or bleeding disorders.    Allergies    No Known Allergies    Intolerances        REVIEW OF SYSTEMS    (  )Fever	     (  )Constipation	(  )SOB				(  )Headache	(  )Dysuria  (  )Chills	     (  )Melena	(  )Dyspnea present on exertion	                    (  )Dizziness                    (  )Polyuria  (  )Nausea	     (  )Hematochezia	(  )Cough			                    (  )Syncope   	(  )Hematuria  (  )Vomiting    (  )Chest Pain	(  )Wheezing			(  )Weakness  (  )Diarrhea     (  )Palpitations	(  )Anorexia			( x )joint pain    All  other review of systems negative: Yes    Vital Signs Last 24 Hrs  T(C): 36.1 (09 May 2023 12:13), Max: 36.4 (09 May 2023 08:36)  T(F): 97 (09 May 2023 12:13), Max: 97.6 (09 May 2023 08:36)  HR: 74 (09 May 2023 14:15) (60 - 79)  BP: 128/73 (09 May 2023 14:15) (110/71 - 153/105)  BP(mean): --  RR: 19 (09 May 2023 14:15) (14 - 19)  SpO2: 96% (09 May 2023 14:15) (96% - 100%)    PHYSICAL EXAM:    Constitutional: Appears Well    Neurological: A& O x 3    Skin: Warm    Respiratory and Thorax: normal effort; Breath sounds: normal; No rales/wheezing/rhonchi  	  Cardiovascular: S1, S2, regular, NMBR	    Gastrointestinal: BS + x 4Q, nontender	    Genitourinary:  Bladder nondistended, nontender    Musculoskeletal:   General Right:   + muscle/joint tenderness,   normal tone, no joint swelling,   ROM: limited	    General Left:   no muscle/joint tenderness,   normal tone, no joint swelling,   ROM: full    Hip:  Right: Dressing CDI;           Lower extrems:   Right: no calf tenderness              negative dottie's sign               + pedal pulses    Left:   no calf tenderness              negative dottie's sign               + pedal pulses                          14.9   8.63  )-----------( 155      ( 09 May 2023 12:46 )             42.7       05-09    141  |  110<H>  |  22  ----------------------------<  110<H>  4.3   |  28  |  1.09    Ca    8.1<L>      09 May 2023 12:46      MEDICATIONS  (STANDING):  lactated ringers. 1000 milliLiter(s) (125 mL/Hr) IV Continuous <Continuous>  tranexamic acid IVPB 1000 milliGRAM(s) IV Intermittent once  tranexamic acid IVPB 1000 milliGRAM(s) IV Intermittent once          DVT Prophylaxis:  LMWH                   (  )  Heparin SQ           (  )  Coumadin             (  )  Xarelto                  (  )  Eliquis                   (  )  Venodynes           (x  )  Ambulation          (x  )  UFH                       (  )  Contraindicated  (  )  EC ASPIRIN       (x)

## 2023-05-09 NOTE — PHYSICAL THERAPY INITIAL EVALUATION ADULT - PERTINENT HX OF CURRENT PROBLEM, REHAB EVAL
58 M with long standing history of right hip pain after a work-related injury.  Over the past several months, he has developed progressive pain of the right hip limiting his ability to ambulate.

## 2023-05-09 NOTE — DISCHARGE NOTE PROVIDER - NSDCFUADDINST_GEN_ALL_CORE_FT
Discharge Instructions for Right Total Hip Arthroplasty:    1. ACTIVITY: WBAT. Rolling walker. Posterior Hip Dislocation Precautions. Abduction Pillow while in bed for 6 weeks. Daily PT.  2. CALL FOR: fever over 101, wound redness, drainage or open area, calf pain/calf swelling.  3. BANDAGE: Change dressing to a new Mepilex Ag bandage POD7 (5/16/23). May change sooner if dressing saturated or falling off. DO NOT REMOVE BANDAGE TO CHECK WOUND ON INTAKE.  4. STAPLES: RN Remove Staples POD14 (5/23/23).  5. SHOWER: Okay to shower. Do not soak, submerge or let shower stream beat on dressing/wound.  6. DVT PE Prophylaxis: Managed by Anticoag Team.  See Med Rec.  7.  GI: Continue Protonix daily while on Anticoagulant. eRx has been sent to your pharmacy.  8. LABS: INR only if on Coumadin.  9.  FOLLOW UP: Dr. Jacobo in 1 month. Call to schedule.  10. MEDICATION: eRX sent to your pharmacy for  if you go home.   11.**Call office if medications not covered under your insurance, especially BLOOD CLOT PREVENTION/anticoagulant medication.

## 2023-05-09 NOTE — PHYSICAL THERAPY INITIAL EVALUATION ADULT - GENERAL OBSERVATIONS, REHAB EVAL
Pt seen for 45min PT Eval. Pt rec'd semi supine in bed in NAD on 2N. Pt requires SBA for all mobility, maldonado and amb 25ft with RW. Pt left semi supine all needs met, +ABD pillow, RN aware.

## 2023-05-09 NOTE — DISCHARGE NOTE PROVIDER - NSDCFUSCHEDAPPT_GEN_ALL_CORE_FT
[facility]  [address] [facility]  Scheduled Appointment: 06/12/2023

## 2023-05-09 NOTE — DISCHARGE NOTE PROVIDER - NSDCCPCAREPLAN_GEN_ALL_CORE_FT
PRINCIPAL DISCHARGE DIAGNOSIS  Diagnosis: Primary osteoarthritis of right hip  Assessment and Plan of Treatment:

## 2023-05-09 NOTE — BRIEF OPERATIVE NOTE - NSICDXBRIEFPREOP_GEN_ALL_CORE_FT
PRE-OP DIAGNOSIS:  Arthritis of right hip 09-May-2023 12:13:17  Annamarie Astorga  Arthritis of right hip 09-May-2023 12:13:41  Annamarie Astorga

## 2023-05-09 NOTE — DISCHARGE NOTE PROVIDER - NSDCMRMEDTOKEN_GEN_ALL_CORE_FT
Fish Oil once daily:   MiraLax oral powder for reconstitution: 17 gram(s) orally once a day as needed for  constipation  Multiple Vitamins oral tablet: 1 orally once a day  oxyCODONE 5 mg oral tablet: 1 tab(s) orally every 4 hours as needed for  severe pain MDD: 6  Protonix 40 mg oral delayed release tablet: 1 tab(s) orally once a day  Senna 8.6 mg oral tablet: 2 tab(s) orally once a day (at bedtime) as needed for  constipation  Tylenol Extra Strength 500 mg oral tablet: 2 tab(s) orally every 8 hours   Aspirin Enteric Coated 81 mg oral delayed release tablet: 1 tab(s) orally every 12 hours last dose 6/6/2023  MiraLax oral powder for reconstitution: 17 gram(s) orally once a day as needed for  constipation  Multiple Vitamins oral tablet: 1 orally once a day  oxyCODONE 5 mg oral tablet: 1 tab(s) orally every 4 hours as needed for  severe pain MDD: 6  Protonix 40 mg oral delayed release tablet: 1 tab(s) orally once a day  Senna 8.6 mg oral tablet: 2 tab(s) orally once a day (at bedtime) as needed for  constipation  Tylenol Extra Strength 500 mg oral tablet: 2 tab(s) orally every 8 hours

## 2023-05-09 NOTE — CONSULT NOTE ADULT - ASSESSMENT
Patient is a 58y old  Male who presents with a chief complaint of right hip  pain now s/p Right TH replacement (09 May 2023 13:45)  Consulted by Dr. Jay Jacobo  for VTE prophylaxis, risk stratification, and anticoagulation management. Patient is high risk for VTE and low bleeding risk.    plan  Discussed the risks vs benefits of  aspirin therapy with patient. Agrees with treatment and understands the necessity of therapy.    Plan:    Enteric coated ASA 81mg PO BID x 4 weeks last dose 6/6/2023    Protonix 40mg PO daily while on Aspirin    Daily CBC/BMP    Venodynes    Enc ambulation    Thank you for this consult will sign off please reconsult if needed        Patient is a 58y old  Male who presents with a chief complaint of right hip  pain now s/p Right TH replacement (09 May 2023 13:45)  Consulted by Dr. Jay Jacobo  for VTE prophylaxis, risk stratification, and anticoagulation management. Patient is high risk for VTE and low bleeding risk.    plan  Discussed the risks vs benefits of  aspirin therapy with patient. Agrees with treatment and understands the necessity of therapy.    Plan:    Enteric coated ASA 81mg PO BID x 4 weeks last dose 6/6/2023    Protonix 40mg PO daily while on Aspirin    Daily CBC/BMP    Venodynes    Enc ambulation    Thank you for this consult will f/u

## 2023-05-10 ENCOUNTER — TRANSCRIPTION ENCOUNTER (OUTPATIENT)
Age: 58
End: 2023-05-10

## 2023-05-10 VITALS
DIASTOLIC BLOOD PRESSURE: 78 MMHG | TEMPERATURE: 98 F | RESPIRATION RATE: 16 BRPM | OXYGEN SATURATION: 96 % | HEART RATE: 102 BPM | SYSTOLIC BLOOD PRESSURE: 130 MMHG

## 2023-05-10 LAB
ANION GAP SERPL CALC-SCNC: 7 MMOL/L — SIGNIFICANT CHANGE UP (ref 5–17)
BUN SERPL-MCNC: 18 MG/DL — SIGNIFICANT CHANGE UP (ref 7–23)
CALCIUM SERPL-MCNC: 8.4 MG/DL — LOW (ref 8.5–10.1)
CHLORIDE SERPL-SCNC: 106 MMOL/L — SIGNIFICANT CHANGE UP (ref 96–108)
CO2 SERPL-SCNC: 26 MMOL/L — SIGNIFICANT CHANGE UP (ref 22–31)
CREAT SERPL-MCNC: 1.06 MG/DL — SIGNIFICANT CHANGE UP (ref 0.5–1.3)
EGFR: 80 ML/MIN/1.73M2 — SIGNIFICANT CHANGE UP
GLUCOSE SERPL-MCNC: 140 MG/DL — HIGH (ref 70–99)
HCT VFR BLD CALC: 40.3 % — SIGNIFICANT CHANGE UP (ref 39–50)
HGB BLD-MCNC: 14.4 G/DL — SIGNIFICANT CHANGE UP (ref 13–17)
MCHC RBC-ENTMCNC: 31.2 PG — SIGNIFICANT CHANGE UP (ref 27–34)
MCHC RBC-ENTMCNC: 35.7 GM/DL — SIGNIFICANT CHANGE UP (ref 32–36)
MCV RBC AUTO: 87.2 FL — SIGNIFICANT CHANGE UP (ref 80–100)
PLATELET # BLD AUTO: 166 K/UL — SIGNIFICANT CHANGE UP (ref 150–400)
POTASSIUM SERPL-MCNC: 3.7 MMOL/L — SIGNIFICANT CHANGE UP (ref 3.5–5.3)
POTASSIUM SERPL-SCNC: 3.7 MMOL/L — SIGNIFICANT CHANGE UP (ref 3.5–5.3)
RBC # BLD: 4.62 M/UL — SIGNIFICANT CHANGE UP (ref 4.2–5.8)
RBC # FLD: 12.2 % — SIGNIFICANT CHANGE UP (ref 10.3–14.5)
SODIUM SERPL-SCNC: 139 MMOL/L — SIGNIFICANT CHANGE UP (ref 135–145)
WBC # BLD: 10.64 K/UL — HIGH (ref 3.8–10.5)
WBC # FLD AUTO: 10.64 K/UL — HIGH (ref 3.8–10.5)

## 2023-05-10 PROCEDURE — 99231 SBSQ HOSP IP/OBS SF/LOW 25: CPT

## 2023-05-10 PROCEDURE — 99232 SBSQ HOSP IP/OBS MODERATE 35: CPT

## 2023-05-10 RX ADMIN — Medication 101.6 MILLIGRAM(S): at 05:13

## 2023-05-10 RX ADMIN — Medication 81 MILLIGRAM(S): at 09:45

## 2023-05-10 RX ADMIN — CELECOXIB 200 MILLIGRAM(S): 200 CAPSULE ORAL at 09:46

## 2023-05-10 RX ADMIN — OXYCODONE HYDROCHLORIDE 10 MILLIGRAM(S): 5 TABLET ORAL at 04:47

## 2023-05-10 RX ADMIN — OXYCODONE HYDROCHLORIDE 10 MILLIGRAM(S): 5 TABLET ORAL at 09:45

## 2023-05-10 RX ADMIN — OXYCODONE HYDROCHLORIDE 10 MILLIGRAM(S): 5 TABLET ORAL at 01:37

## 2023-05-10 RX ADMIN — OXYCODONE HYDROCHLORIDE 10 MILLIGRAM(S): 5 TABLET ORAL at 05:57

## 2023-05-10 RX ADMIN — Medication 325 MILLIGRAM(S): at 09:46

## 2023-05-10 RX ADMIN — OXYCODONE HYDROCHLORIDE 10 MILLIGRAM(S): 5 TABLET ORAL at 02:37

## 2023-05-10 RX ADMIN — Medication 100 MILLIGRAM(S): at 01:32

## 2023-05-10 RX ADMIN — Medication 1 TABLET(S): at 09:45

## 2023-05-10 RX ADMIN — OXYCODONE HYDROCHLORIDE 10 MILLIGRAM(S): 5 TABLET ORAL at 10:45

## 2023-05-10 RX ADMIN — Medication 1 MILLIGRAM(S): at 09:46

## 2023-05-10 NOTE — PROGRESS NOTE ADULT - SUBJECTIVE AND OBJECTIVE BOX
HPI:      Patient is a 58y old  Male who presents with a chief complaint of right hip  pain now s/p Right TH replacement (09 May 2023 13:45)      Consulted by Dr. Jay Jacobo  for VTE prophylaxis, risk stratification, and anticoagulation management.    PAST MEDICAL & SURGICAL HISTORY:  Arthritis      Risk factors for obstructive sleep apnea      Diverticulitis      Hyperlipidemia      History of dental surgery      H/O colonoscopy    Interval History    2023:Patient seen at bedside , discussed the necessity of DVT prophylaxis post procedure, patient denies any h/o vte, stroke, or cancer , advised patient to take ASA 81mg BID with food for 4 weeks , advised patient to refrain from NSAIDs while on ASA , patient in agreement with the plan   5/10/2023:Patient seen at bedside , reiterated  the necessity of DVT prophylaxis with ASA, denies any concerns possible home today      CrCl:75  BMI:28.9  EBL:    Caprini VTE Risk Score:  AGE RELATED RISK FACTORS                                                       MOBILITY RELATED FACTORS  [x ] Age 41-60 years                                            (1 Point)                  [ ] Bed rest /restricted mobility                             (1 Point)  [ ] Age: 61-74 years                                           (2 Points)                [ ] Plaster cast                                                   (2 Points)  [ ] Age= 75 years                                              (3 Points)                 [ ] Bed bound for more than 72 hours                   (2 Points)    DISEASE RELATED RISK FACTORS                                               GENDER SPECIFIC FACTORS  [ ] Edema in the lower extremities                       (1 Point)           [ ] Pregnancy                                                            (1 Point)  [ ] Varicose veins                                               (1 Point)                  [ ] Post-partum < 6 weeks                                      (1 Point)             [x ] BMI > 25 Kg/m2                                            (1 Point)                  [ ] Hormonal therapy or oral contraception       (1 Point)                 [ ] Sepsis (in the previous month)                        (1 Point)             [ ] History of pregnancy complications                (1Point)  [ ] Pneumonia or serious lung disease                                             [ ] Unexplained or recurrent  (=/>3), premature                                 (In the previous month)                               (1 Point)                birth with toxemia or growth-restricted infant (1 Point)  [ ] Abnormal pulmonary function test            (1 Point)                                   SURGERY RELATED RISK FACTORS  [ ] Acute myocardial infarction                       (1 Point)                  [ ]  Section                                         (1 Point)  [ ] Congestive heart failure (in the previous month) (1 Point)   [ ] Minor surgery   lasting <45 minutes       (1 Point)   [ ] Inflammatory bowel disease                             (1 Point)          [ ] Arthroscopic surgery                                  (2 Points)  [ ] Central venous access                                    (2 Points)            [ ] General surgery lasting >45 minutes      (2 Points)       [ ] Stroke (in the previous month)                  (5 Points)            [x ] Elective major lower extremity arthroplasty (5 Points)                                   [  ] Malignancy (present or past include skin melanoma                                          but exclude  basal skin cell)    (2 points)                                      TRAUMA RELATED RISK FACTORS                HEMATOLOGY RELATED FACTORS                                  [ ] Fracture of the hip, pelvis, or leg                       (5 Points)  [ ] Prior episodes of VTE                                     (3 Points)          [ ] Acute spinal cord injury (in the previous month)  (5 Points)  [ ] Positive family history for VTE                         (3 Points)       [ ] Paralysis (less than 1 month)                          (5 Points)  [ ] Prothrombin 16732 A                                      (3 Points)         [ ] Multiple Trauma (within 1month)                 (5Points)                                                                                                                                                                [ ] Factor V Leiden                                          (3 Points)                                OTHER RISK FACTORS                          [ ] Lupus anticoagulants                                     (3 Points)                       [ ] BMI > 40                          (1 Point)                                                         [ ] Anticardiolipin antibodies                                (3 Points)                   [ ] Smoking                              (1Point)                                                [ ] High homocysteine in the blood                      (3 Points)                [  ] Diabetes requiring insulin (1point)                         [ ] Other congenital or acquired thrombophilia       (3 Points)          [  ] Chemotherapy                   (1 Point)  [ ] Heparin induced thrombocytopenia                  (3 Points)             [  ] Blood Transfusion                (1 point)                                                                                                             Total Score [  7        ]                                                                                                                                                                                                                                         IMPROVE Bleeding Risk Score:2.5      Falls Risk:   High (  )  Mod ( x )  Low (  )      FAMILY HISTORY:  FH: heart disease (Father)    FH: thyroid cancer (Sibling)    FH: kidney cancer (Sibling)    FH: lung cancer (Sibling)      Denies any personal or familial history of clotting or bleeding disorders.    Allergies    No Known Allergies    Intolerances        REVIEW OF SYSTEMS    (  )Fever	     (  )Constipation	(  )SOB				(  )Headache	(  )Dysuria  (  )Chills	     (  )Melena	(  )Dyspnea present on exertion	                    (  )Dizziness                    (  )Polyuria  (  )Nausea	     (  )Hematochezia	(  )Cough			                    (  )Syncope   	(  )Hematuria  (  )Vomiting    (  )Chest Pain	(  )Wheezing			(  )Weakness  (  )Diarrhea     (  )Palpitations	(  )Anorexia			( x )joint pain    All  other review of systems negative: Yes    Vital Signs Last 24 Hrs  T(C): 37.3 (10 May 2023 08:00), Max: 37.3 (10 May 2023 08:00)  T(F): 99.1 (10 May 2023 08:00), Max: 99.1 (10 May 2023 08:00)  HR: 100 (10 May 2023 08:00) (65 - 100)  BP: 155/88 (10 May 2023 08:00) (102/77 - 155/88)  BP(mean): 108 (09 May 2023 15:58) (108 - 108)  RR: 16 (10 May 2023 08:00) (14 - 19)  SpO2: 96% (10 May 2023 08:00) (96% - 100%)  PHYSICAL EXAM:    Constitutional: Appears Well    Neurological: A& O x 3    Skin: Warm    Respiratory and Thorax: normal effort; Breath sounds: normal; No rales/wheezing/rhonchi  	  Cardiovascular: S1, S2, regular, NMBR	    Gastrointestinal: BS + x 4Q, nontender	    Genitourinary:  Bladder nondistended, nontender    Musculoskeletal:   General Right:   + muscle/joint tenderness,   normal tone, no joint swelling,   ROM: limited	    General Left:   no muscle/joint tenderness,   normal tone, no joint swelling,   ROM: full    Hip:  Right: Dressing CDI;           Lower extrems:   Right: no calf tenderness              negative dottie's sign               + pedal pulses    Left:   no calf tenderness              negative dottie's sign               + pedal pulses                        14.4   10.64 )-----------( 166      ( 10 May 2023 07:09 )             40.3       05-10    139  |  106  |  18  ----------------------------<  140<H>  3.7   |  26  |  1.06    Ca    8.4<L>      10 May 2023 07:09                                14.9   8.63  )-----------( 155      ( 09 May 2023 12:46 )             42.7       05-09    141  |  110<H>  |  22  ----------------------------<  110<H>  4.3   |  28  |  1.09    Ca    8.1<L>      09 May 2023 12:46    MEDICATIONS  (STANDING):  acetaminophen     Tablet .. 975 milliGRAM(s) Oral every 8 hours  aspirin 81 milliGRAM(s) Oral two times a day  celecoxib 200 milliGRAM(s) Oral every 12 hours  ferrous    sulfate 325 milliGRAM(s) Oral daily  folic acid 1 milliGRAM(s) Oral daily  lactated ringers. 1000 milliLiter(s) (75 mL/Hr) IV Continuous <Continuous>  multivitamin 1 Tablet(s) Oral daily  polyethylene glycol 3350 17 Gram(s) Oral at bedtime  senna 2 Tablet(s) Oral at bedtime  tranexamic acid IVPB 1000 milliGRAM(s) IV Intermittent once  tranexamic acid IVPB 1000 milliGRAM(s) IV Intermittent once        DVT Prophylaxis:  LMWH                   (  )  Heparin SQ           (  )  Coumadin             (  )  Xarelto                  (  )  Eliquis                   (  )  Venodynes           (x  )  Ambulation          (x  )  UFH                       (  )  Contraindicated  (  )  EC ASPIRIN       (x)

## 2023-05-10 NOTE — OCCUPATIONAL THERAPY INITIAL EVALUATION ADULT - PERTINENT HX OF CURRENT PROBLEM, REHAB EVAL
Pt is a 59 y/o male with long standing history of right hip pain after a work-related injury.  Over the past several months, he has developed progressive pain of the right hip limiting his ability to ambulate. Pt is now s/p R DAKOTAH.

## 2023-05-10 NOTE — PROGRESS NOTE ADULT - SUBJECTIVE AND OBJECTIVE BOX
PCP:  Kathya Cyr  Ortho:  [facility]    CHIEF COMPLAINT:  right hip pain    HPI: 58M, pmh of HLD, RBBB, Diverticulitis, witha  long standing history of right hip pain after a work-related injury.  Over the past several months, he has developed progressive pain of the right hip limiting his ability to ambulate.  He has pain with walking on flat surfaces, up inclines and also gets pain at rest at night.  He has tried conservative strategies to mitigate the pain which have been short-lived.  The pain has also limited his ability to complete his activities of daily living.  He was evaluated by ortho and had imaging done which shows severe bone-on-bone osteoarthritis.  He agreed to undergo surgical intervention.  5/9, he had a right total hip replacement with Dr. Jacobo.  Hospitalist service consulted for medical comanagement.     pt seen and examined this am. doing well.   Pain controlled. no sob/chest pain. Ambulated. no dizziness/lightheadedness. tolerating po. no difficulty voiding.     ROS: all 10 systems reviewed and is as above otherwise negative.     Vital Signs Last 24 Hrs  T(C): 36.5 (10 May 2023 12:01), Max: 37.3 (10 May 2023 08:00)  T(F): 97.7 (10 May 2023 12:01), Max: 99.1 (10 May 2023 08:00)  HR: 102 (10 May 2023 12:01) (70 - 102)  BP: 130/78 (10 May 2023 12:01) (130/78 - 155/88)  BP(mean): 108 (09 May 2023 15:58) (108 - 108)  RR: 16 (10 May 2023 12:01) (16 - 19)  SpO2: 96% (10 May 2023 12:01) (96% - 100%)    Parameters below as of 10 May 2023 12:01  Patient On (Oxygen Delivery Method): room air    PHYSICAL EXAM:    GENERAL: Comfortable, no acute distress   HEAD:  Normocephalic, atraumatic  EYES: EOMI, PERRLA  HEENT: Moist mucous membranes  NECK: Supple, No JVD  NERVOUS SYSTEM:  Alert & Oriented X3, Right hip dressing c/d/i.  CHEST/LUNG: Clear to auscultation bilaterally  HEART: Regular rate and rhythm  ABDOMEN: Soft, Nontender, Nondistended, Bowel sounds present  GENITOURINARY: Voiding, no palpable bladder  EXTREMITIES:   No clubbing, cyanosis, or edema  MUSCULOSKELTAL- No muscle tenderness, no joint tenderness  SKIN-no rash  LABS:                        14.4   10.64 )-----------( 166      ( 10 May 2023 07:09 )             40.3     05-10    139  |  106  |  18  ----------------------------<  140<H>  3.7   |  26  |  1.06    Ca    8.4<L>      10 May 2023 07:09      MEDICATIONS  (STANDING):  acetaminophen     Tablet .. 975 milliGRAM(s) Oral every 8 hours  aspirin 81 milliGRAM(s) Oral two times a day  celecoxib 200 milliGRAM(s) Oral every 12 hours  ferrous    sulfate 325 milliGRAM(s) Oral daily  folic acid 1 milliGRAM(s) Oral daily  lactated ringers. 1000 milliLiter(s) (75 mL/Hr) IV Continuous <Continuous>  multivitamin 1 Tablet(s) Oral daily  polyethylene glycol 3350 17 Gram(s) Oral at bedtime  senna 2 Tablet(s) Oral at bedtime  tranexamic acid IVPB 1000 milliGRAM(s) IV Intermittent once  tranexamic acid IVPB 1000 milliGRAM(s) IV Intermittent once    MEDICATIONS  (PRN):  HYDROmorphone  Injectable 0.5 milliGRAM(s) SubCutaneous every 4 hours PRN Severe Pain (7 - 10)  magnesium hydroxide Suspension 30 milliLiter(s) Oral daily PRN Constipation  ondansetron Injectable 4 milliGRAM(s) IV Push every 6 hours PRN Nausea and/or Vomiting  oxyCODONE    IR 5 milliGRAM(s) Oral every 3 hours PRN Mild Pain (1 - 3)  oxyCODONE    IR 10 milliGRAM(s) Oral every 3 hours PRN Moderate Pain (4 - 6)      ASSESSMENT AND PLAN:  58m, PMH as above a/w    1. OA right hip s/p right hip arthroplasty:  -POD#1  -pain control  -physical therapy  -incentive spirometry  -bowel regimen.   -mild leukocytosis likely related to decadron    2. HLD:  -not on meds.     3. DVT px  -asa bid per a/c services

## 2023-05-10 NOTE — OCCUPATIONAL THERAPY INITIAL EVALUATION ADULT - NSACTIVITYREC_GEN_A_OT
Pt with no continued OT needs at this time, with good adherence/implementation of hip precautions, good understanding of use of hip kit. Provided with handout on posterior hip precautions and AE/DME recommendations/where to self-purchase.

## 2023-05-10 NOTE — OCCUPATIONAL THERAPY INITIAL EVALUATION ADULT - LEVEL OF INDEPENDENCE: SIT/SUPINE, REHAB EVAL
increased time to manage leg in/out of bed- educated on compensatory techniques using a bed sheet/independent

## 2023-05-10 NOTE — OCCUPATIONAL THERAPY INITIAL EVALUATION ADULT - ADDITIONAL COMMENTS
[place] with his [place] with 6 steps to enter, ~7 up 7 down Pt resides in a [place] with his wife with 6 steps to enter, ~7 up 7 down, tub/shower combo, comfort height toilet, no DME in bathroom, retired, +, (I) with ADL, IADL tasks PTA, walked without AD.

## 2023-05-10 NOTE — PROGRESS NOTE ADULT - SUBJECTIVE AND OBJECTIVE BOX
This is a 57y/o Male s/p Right Total Hip Arthroplasty POD 0.  Pain is controlled. Pt feeling well. No nausea or vomiting. Pt was up OOB today with PT.    Vital Signs Last 24 Hrs  T(C): 37.1 (05-10-23 @ 00:18), Max: 37.1 (05-10-23 @ 00:18)  T(F): 98.8 (05-10-23 @ 00:18), Max: 98.8 (05-10-23 @ 00:18)  HR: 70 (05-10-23 @ 00:18) (60 - 79)  BP: 150/78 (05-10-23 @ 00:18) (102/77 - 153/105)  BP(mean): 108 (05-09-23 @ 15:58) (108 - 108)  RR: 16 (05-10-23 @ 00:18) (14 - 19)  SpO2: 96% (05-10-23 @ 00:18) (96% - 100%)      Exam:  NAD AAOx3.    RLE  Dressing clean, dry and intact.  SCDs and abduction pillow in place.  Calves are soft and nontender.  Moving all toes and ankle, +EHL/FHL/TA/GS.  SILT throughout.  DP and PT pulses 2+.    A/P:  58M Stable POD 1 Right Total Hip Arthroplasty  -Analgesia  -Ice application  -DVT PE ppx - A81 BID  -Incentive spirometry  -OOB PT WBAT RLE with hip precautions  -Abduction pillow  -Dispo - home per pt

## 2023-05-10 NOTE — OCCUPATIONAL THERAPY INITIAL EVALUATION ADULT - GENERAL OBSERVATIONS, REHAB EVAL
Pt rec'd/left semi-supine in bed, in NAD, bed alarmed, CB/TT/phone IR, abduction pillow in place, lines intact.

## 2023-05-10 NOTE — PROGRESS NOTE ADULT - ASSESSMENT
Patient is a 58y old  Male who presents with a chief complaint of right hip  pain now s/p Right TH replacement (09 May 2023 13:45)  Consulted by Dr. Jay Jacobo  for VTE prophylaxis, risk stratification, and anticoagulation management. Patient is high risk for VTE and low bleeding risk.    plan  Discussed the risks vs benefits of  aspirin therapy with patient. Agrees with treatment and understands the necessity of therapy.    Plan:    Enteric coated ASA 81mg PO BID x 4 weeks last dose 6/6/2023    Protonix 40mg PO daily while on Aspirin    Daily CBC/BMP    Venodynes    Enc ambulation     will sign off please reconsult if needed

## 2023-05-10 NOTE — DISCHARGE NOTE NURSING/CASE MANAGEMENT/SOCIAL WORK - PATIENT PORTAL LINK FT
20-Nov-2021 02:30 You can access the FollowMyHealth Patient Portal offered by [facility] by registering at the following website: [email or web address]. By joining [facility]’s FollowMyHealth portal, you will also be able to view your health information using other applications (apps) compatible with our system.

## 2023-05-10 NOTE — OCCUPATIONAL THERAPY INITIAL EVALUATION ADULT - MD ORDER
"OT Evaluate and Treat"- MD orders received. Chart reviewed, contents noted, conferred with RAFA Joyner: "OT Evaluate and Treat"- MD orders received. Chart reviewed, contents noted, conferred with CARLTON Murrieta.

## 2023-05-10 NOTE — DISCHARGE NOTE NURSING/CASE MANAGEMENT/SOCIAL WORK - NSDCPEFALRISK_GEN_ALL_CORE
For information on Fall & Injury Prevention, visit: [email or web address] OR  [email or web address] OR  https://www.cdc.gov/steadi/patient.html

## 2023-05-11 ENCOUNTER — TRANSCRIPTION ENCOUNTER (OUTPATIENT)
Age: 58
End: 2023-05-11

## 2023-05-12 DIAGNOSIS — K57.90 DIVERTICULOSIS OF INTESTINE, PART UNSPECIFIED, WITHOUT PERFORATION OR ABSCESS WITHOUT BLEEDING: ICD-10-CM

## 2023-05-12 DIAGNOSIS — I45.10 UNSPECIFIED RIGHT BUNDLE-BRANCH BLOCK: ICD-10-CM

## 2023-05-12 DIAGNOSIS — E78.5 HYPERLIPIDEMIA, UNSPECIFIED: ICD-10-CM

## 2023-05-12 DIAGNOSIS — M16.51 UNILATERAL POST-TRAUMATIC OSTEOARTHRITIS, RIGHT HIP: ICD-10-CM

## 2023-05-16 LAB — SURGICAL PATHOLOGY STUDY: SIGNIFICANT CHANGE UP

## 2023-05-19 ENCOUNTER — TRANSCRIPTION ENCOUNTER (OUTPATIENT)
Age: 58
End: 2023-05-19

## 2023-06-02 ENCOUNTER — TRANSCRIPTION ENCOUNTER (OUTPATIENT)
Age: 58
End: 2023-06-02

## 2023-06-12 ENCOUNTER — APPOINTMENT (OUTPATIENT)
Dept: ORTHOPEDIC SURGERY | Facility: CLINIC | Age: 58
End: 2023-06-12
Payer: OTHER MISCELLANEOUS

## 2023-06-12 VITALS — BODY MASS INDEX: 28.7 KG/M2 | HEIGHT: 71 IN | WEIGHT: 205 LBS

## 2023-06-12 PROBLEM — E78.5 HYPERLIPIDEMIA, UNSPECIFIED: Chronic | Status: ACTIVE | Noted: 2023-05-03

## 2023-06-12 PROBLEM — M19.90 UNSPECIFIED OSTEOARTHRITIS, UNSPECIFIED SITE: Chronic | Status: ACTIVE | Noted: 2023-05-03

## 2023-06-12 PROBLEM — K57.92 DIVERTICULITIS OF INTESTINE, PART UNSPECIFIED, WITHOUT PERFORATION OR ABSCESS WITHOUT BLEEDING: Chronic | Status: ACTIVE | Noted: 2023-05-03

## 2023-06-12 PROBLEM — Z91.89 OTHER SPECIFIED PERSONAL RISK FACTORS, NOT ELSEWHERE CLASSIFIED: Chronic | Status: ACTIVE | Noted: 2023-05-03

## 2023-06-12 PROCEDURE — 99024 POSTOP FOLLOW-UP VISIT: CPT

## 2023-06-12 PROCEDURE — 73502 X-RAY EXAM HIP UNI 2-3 VIEWS: CPT

## 2023-06-12 NOTE — HISTORY OF PRESENT ILLNESS
[0] : 0 [] : Post Surgical Visit: yes [FreeTextEntry1] : Rt. hip [FreeTextEntry5] : 57 y/o M presents for PO #1 eval. of Rt. hip. s/p Rt. THR on DOS noted above. Pt reports recovery is going well with no pain.  [de-identified] : 5/9/23 [de-identified] : Dr. Jacobo [de-identified] : 5/9/23 [de-identified] : 5/9/23 [de-identified] : Rt. THR

## 2023-06-12 NOTE — ASSESSMENT
[FreeTextEntry1] : Patient comes in today for follow-up on his right hip.  He is now 5 weeks out from his right total hip replacement for posttraumatic arthritis and is doing beautifully.  He is walking without assistive devices.  He has no pain in the groin.  He has a little bit of pain in the right knee.  He denies any fevers.  He is currently in physical therapy.  He is currently not working as he is retired.\par \par Examination of the right lower extremity feels normal neurovascular exam.  Examination the right hip reveals well-healed scar with no signs of infection or DVT.  His leg lengths are equal.  His range of motion is good with just some stiffness at the extremes.  He has full strength of hip flexion and abduction.  He walks without a limp.\par \par X-rays done in the office today of the right hip 2 views and the AP pelvis 1 view shows the right total hip replacement in good position without signs of loosening or wear.  He has 2 cables around the femur for the small nondisplaced calcar fracture intraoperatively.  This is completely healed on the x-ray and there is been no propagation or loosening of the prosthesis.  The left hip is normal.  There were no obvious tumors masses or calcifications seen.\par \par Plan at this time is to continue physical therapy for the right hip.  He will maintain his right total hip precautions.  He has 100% temporary partial disability to the right hip.  He is currently not working.  His right hip pain is causally related to his work accident.  I will see him back in 2 months.

## 2023-06-16 ENCOUNTER — TRANSCRIPTION ENCOUNTER (OUTPATIENT)
Age: 58
End: 2023-06-16

## 2023-07-25 ENCOUNTER — TRANSCRIPTION ENCOUNTER (OUTPATIENT)
Age: 58
End: 2023-07-25

## 2023-08-16 ENCOUNTER — APPOINTMENT (OUTPATIENT)
Dept: ORTHOPEDIC SURGERY | Facility: CLINIC | Age: 58
End: 2023-08-16
Payer: OTHER MISCELLANEOUS

## 2023-08-16 VITALS — HEIGHT: 71 IN | BODY MASS INDEX: 28.7 KG/M2 | WEIGHT: 205 LBS

## 2023-08-16 PROCEDURE — 99213 OFFICE O/P EST LOW 20 MIN: CPT

## 2023-08-16 RX ORDER — ROSUVASTATIN CALCIUM 5 MG/1
TABLET, FILM COATED ORAL
Refills: 0 | Status: ACTIVE | COMMUNITY

## 2023-08-17 NOTE — ASSESSMENT
[FreeTextEntry1] : Patient comes in today for follow-up on his right hip.  He is now 14 weeks out from his right total hip replacement for posttraumatic arthritis and is doing beautifully.  He is walking without assistive devices.  He has no pain in the groin.  The patient is currently not working as he is retired. He restarted PT as he waited for auth but may finish the next few weeks and stop completely. He denies new trauma. He is able to complete ADLs easily. He has stiffness with sitting for longer periods but is fine atfter a few steps. He is happy with his progress.   Right knee exam: Neurovascularly intact. Sensation intact.  Examination the right hip reveals well-healed scar with no signs of infection or DVT.  His leg lengths are equal.  His range of motion is good with just some stiffness at the extremes.  He has full strength of hip flexion and abduction.  He walks without a limp.  Plan at this time is to continue physical therapy for the right hip. He has 100% temporary partial disability to the right hip.  He is currently not working.  His right hip pain is causally related to his work accident. Follow up as needed.

## 2023-08-17 NOTE — HISTORY OF PRESENT ILLNESS
[Work related] : work related [2] : 2 [0] : 0 [Dull/Aching] : dull/aching [Rest] : rest [Retired] : Work status: retired [] : no [FreeTextEntry1] : Rt. hip [FreeTextEntry5] : 57 y/o M presents for PO #2 eval. of Rt. hip. s/p Rt. THR on DOS noted above. Pt reports recovery is going well with minimal pain persisting.  [de-identified] : 6/12/23 [de-identified] : Dr. Jacobo [de-identified] : 5/9/23 [de-identified] : 5/9/23 [de-identified] : Rt. THR

## 2023-10-25 ENCOUNTER — APPOINTMENT (OUTPATIENT)
Dept: ORTHOPEDIC SURGERY | Facility: CLINIC | Age: 58
End: 2023-10-25
Payer: OTHER MISCELLANEOUS

## 2023-10-25 VITALS — HEIGHT: 71 IN | BODY MASS INDEX: 28 KG/M2 | WEIGHT: 200 LBS

## 2023-10-25 PROCEDURE — 99213 OFFICE O/P EST LOW 20 MIN: CPT

## 2023-12-20 ENCOUNTER — APPOINTMENT (OUTPATIENT)
Dept: ORTHOPEDIC SURGERY | Facility: CLINIC | Age: 58
End: 2023-12-20
Payer: OTHER MISCELLANEOUS

## 2023-12-20 VITALS — WEIGHT: 202 LBS | HEIGHT: 71 IN | BODY MASS INDEX: 28.28 KG/M2

## 2023-12-20 PROCEDURE — 99213 OFFICE O/P EST LOW 20 MIN: CPT

## 2023-12-22 NOTE — ASSESSMENT
[FreeTextEntry1] : The patient is 7 months s/p a right total hip replacement on 5/9/23. He is walking without assistive devices. He has no pain in the groin. The patient is currently not working as he is retired. The patient denies new trauma. He denies paresthesias. He has persisted through a trochanteric bursitis that is mild and intermittent. He does not require pain medications for this.   Right hip exam: Neurovascularly intact. Sensation intact. Examination the right hip reveals well-healed scar with no signs of infection or DVT. His leg lengths are equal. His range of motion is good with just some stiffness at the extremes. He has full strength of hip flexion and abduction. He walks without a limp. +SLR. 5/5 abductor strength.  He has 100% temporary partial disability to the right hip. He is currently not working. His right hip pain is causally related to his work accident.  Follow-up in the office in 3 months please

## 2023-12-22 NOTE — HISTORY OF PRESENT ILLNESS
[Work related] : work related [3] : 3 [0] : 0 [Dull/Aching] : dull/aching [Tightness] : tightness [Constant] : constant [Rest] : rest [Bending forward] : bending forward [Retired] : Work status: retired [] : no [FreeTextEntry1] : Rt. hip [FreeTextEntry5] : pt here for WC PO#4 today. states it still has sharp pains when bending. tightness. stopped PT [FreeTextEntry6] : numbness [de-identified] : 5/9/23 [de-identified] : 5/9/23 [de-identified] : Rt. THR

## 2024-03-27 ENCOUNTER — APPOINTMENT (OUTPATIENT)
Dept: ORTHOPEDIC SURGERY | Facility: CLINIC | Age: 59
End: 2024-03-27
Payer: OTHER MISCELLANEOUS

## 2024-03-27 VITALS — HEIGHT: 71 IN | WEIGHT: 202 LBS | BODY MASS INDEX: 28.28 KG/M2

## 2024-03-27 DIAGNOSIS — M17.31 UNILATERAL POST-TRAUMATIC OSTEOARTHRITIS, RIGHT KNEE: ICD-10-CM

## 2024-03-27 PROCEDURE — 99212 OFFICE O/P EST SF 10 MIN: CPT

## 2024-03-27 NOTE — ASSESSMENT
[FreeTextEntry1] : The patient is 10 months s/p a right total hip replacement on 5/9/23. He is walking without assistive devices. He has no pain in the groin. The patient is currently not working as he is retired. The patient denies new trauma. He denies paresthesias. He has persisted through a trochanteric bursitis that is mild and intermittent. He does not require pain medications for this. The patient reports very few limitations with the hip at this time. He is happy with his progress.  Right hip exam: Neurovascularly intact. Sensation intact. Examination the right hip reveals well-healed scar with no signs of infection or DVT. His leg lengths are equal. His range of motion is good with just some stiffness at the extremes. He has full strength of hip flexion and abduction. He walks without a limp. +SLR. 5/5 abductor strength.  He has 100% temporary partial disability to the right hip. He is currently not working. His right hip pain is causally related to his work accident. The patient will return to see Dr. Jacobo after 5/9/23 in for a [facility] appointment.

## 2024-03-27 NOTE — HISTORY OF PRESENT ILLNESS
[Work related] : work related [3] : 3 [0] : 0 [Dull/Aching] : dull/aching [Tightness] : tightness [Rest] : rest [Bending forward] : bending forward [Retired] : Work status: retired [] : no [FreeTextEntry5] : 59 y/o M presents for [facility] eval of the Rt. hip today. Pt reports of improvement with minimal reported pain today.

## 2024-05-22 ENCOUNTER — APPOINTMENT (OUTPATIENT)
Dept: ORTHOPEDIC SURGERY | Facility: CLINIC | Age: 59
End: 2024-05-22
Payer: OTHER MISCELLANEOUS

## 2024-05-22 VITALS — HEIGHT: 71 IN | BODY MASS INDEX: 28.14 KG/M2 | WEIGHT: 201 LBS

## 2024-05-22 DIAGNOSIS — M16.51 UNILATERAL POST-TRAUMATIC OSTEOARTHRITIS, RIGHT HIP: ICD-10-CM

## 2024-05-22 PROCEDURE — 99243 OFF/OP CNSLTJ NEW/EST LOW 30: CPT

## 2024-05-22 PROCEDURE — 99455 WORK RELATED DISABILITY EXAM: CPT

## 2024-05-22 RX ORDER — AMLODIPINE BESYLATE 5 MG/1
TABLET ORAL
Refills: 0 | Status: ACTIVE | COMMUNITY

## 2024-05-22 RX ORDER — SOLIFENACIN SUCCINATE 10 MG/1
TABLET, FILM COATED ORAL
Refills: 0 | Status: ACTIVE | COMMUNITY

## 2024-05-22 RX ORDER — ALFUZOSIN HYDROCHLORIDE 10 MG/1
10 TABLET, EXTENDED RELEASE ORAL
Refills: 0 | Status: ACTIVE | COMMUNITY

## 2024-05-23 PROBLEM — M16.51 POST-TRAUMATIC OSTEOARTHRITIS OF RIGHT HIP: Status: ACTIVE | Noted: 2022-11-09

## 2024-05-23 NOTE — HISTORY OF PRESENT ILLNESS
[Work related] : work related [Dull/Aching] : dull/aching [Tightness] : tightness [Rest] : rest [Bending forward] : bending forward [Retired] : Work status: retired [] : no [FreeTextEntry3] : 8/2/2021 [FreeTextEntry5] : 60 y/o M presents for [facility] eval of the Rt. hip today. Pt reports recovery is going well with improvement.  [de-identified] : [place] [id number]

## 2024-05-23 NOTE — ASSESSMENT
[FreeTextEntry1] : The patient comes in today for follow-up on his right hip.  He is now over a year out from his right total hip replacement for posttraumatic arthritis.  The surgery was May 9, 2023.  He is currently retired from his job and is not working.  His main complaint is weakness going up and down stairs and going from sitting to standing.  He has no pain.  Examination the right lower extremity reveals normal neurovascular exam.  Examination of his right hip reveals equal leg lengths.  He has a well-healed scar with no signs of infection or DVT and no point tenderness over the greater trochanter.  He has no weakness of hip flexion or abduction.  His range of motion is as follows.  He has flexion to 100 degrees compared to 115 degrees on his opposite side.  He has internal and external rotation to 40 degrees on the right side and internal and external rotation degrees to 45 degrees on the left side.  He has 45 degrees of abduction on the right which is equal to his left side and he has adduction to 20 degrees on the right where it is 30 degrees on the left.  Measuring his quadriceps diameter at the mid thigh he has 2 cm of atrophy on the right compared to the left.  This is consistent with his weakness on stairs and getting out of a chair.  At this point he has reached maximal medical improvement to the right hip with a schedule loss of use of 40%.  This is taken from table 6.5 and the guide where he is given 35% for a right total hip replacement that is functioning well and an additional 5% for moderate atrophy of 2 cm at the mid thigh compared to his opposite side.  He is currently retired from his job and is not working.  His right hip pain is causally related to his work accident.  I will see him back in the office as necessary.

## 2024-05-23 NOTE — REASON FOR VISIT
[FreeTextEntry2] : [facility] - Rt. hip injury (S/P RIGHT TOTAL HIP REPLACEMENT) DOS: 5/9/23 DOI: 8/2/2021